# Patient Record
Sex: FEMALE | Race: WHITE | URBAN - METROPOLITAN AREA
[De-identification: names, ages, dates, MRNs, and addresses within clinical notes are randomized per-mention and may not be internally consistent; named-entity substitution may affect disease eponyms.]

---

## 2017-01-25 PROCEDURE — 87491 CHLMYD TRACH DNA AMP PROBE: CPT | Performed by: OBSTETRICS & GYNECOLOGY

## 2017-01-25 PROCEDURE — 87591 N.GONORRHOEAE DNA AMP PROB: CPT | Performed by: OBSTETRICS & GYNECOLOGY

## 2017-12-21 ENCOUNTER — OFFICE VISIT (OUTPATIENT)
Dept: FAMILY MEDICINE CLINIC | Facility: CLINIC | Age: 22
End: 2017-12-21

## 2017-12-21 VITALS
HEIGHT: 65 IN | TEMPERATURE: 103 F | OXYGEN SATURATION: 98 % | RESPIRATION RATE: 16 BRPM | WEIGHT: 149 LBS | DIASTOLIC BLOOD PRESSURE: 70 MMHG | HEART RATE: 121 BPM | SYSTOLIC BLOOD PRESSURE: 108 MMHG | BODY MASS INDEX: 24.83 KG/M2

## 2017-12-21 DIAGNOSIS — R68.89 FLU-LIKE SYMPTOMS: Primary | ICD-10-CM

## 2017-12-21 PROCEDURE — 99203 OFFICE O/P NEW LOW 30 MIN: CPT | Performed by: NURSE PRACTITIONER

## 2017-12-22 NOTE — PROGRESS NOTES
Patient presents with:  Cough: nasal congestion,bodyache,headache,chills sx x 2-3 days.   :    HPI:   Angela Sainz is a 20year old female who presents for upper respiratory symptoms for  3  days. Started suddenly. Getting worse.  Feeling feverish,chills exertion  GI: no nausea or abdominal pain, diarrhea. URO: no decreased urination.       EXAM:   /70 (BP Location: Right arm, Patient Position: Sitting, Cuff Size: adult)   Pulse 121   Temp (!) 102.8 °F (39.3 °C)   Resp 16   Ht 65\"   Wt 149 lb   LMP

## 2018-01-26 PROCEDURE — 87591 N.GONORRHOEAE DNA AMP PROB: CPT | Performed by: OBSTETRICS & GYNECOLOGY

## 2018-01-26 PROCEDURE — 88175 CYTOPATH C/V AUTO FLUID REDO: CPT | Performed by: OBSTETRICS & GYNECOLOGY

## 2018-01-26 PROCEDURE — 87491 CHLMYD TRACH DNA AMP PROBE: CPT | Performed by: OBSTETRICS & GYNECOLOGY

## 2019-07-11 PROCEDURE — 86480 TB TEST CELL IMMUN MEASURE: CPT | Performed by: FAMILY MEDICINE

## 2019-07-11 PROCEDURE — 86706 HEP B SURFACE ANTIBODY: CPT | Performed by: FAMILY MEDICINE

## 2019-10-29 PROBLEM — S62.355D CLOSED NONDISPLACED FRACTURE OF SHAFT OF FOURTH METACARPAL BONE OF LEFT HAND WITH ROUTINE HEALING, SUBSEQUENT ENCOUNTER: Status: ACTIVE | Noted: 2019-10-29

## 2021-02-20 ENCOUNTER — WALK IN (OUTPATIENT)
Dept: URGENT CARE | Age: 26
End: 2021-02-20

## 2021-02-20 DIAGNOSIS — N30.00 ACUTE CYSTITIS WITHOUT HEMATURIA: Primary | ICD-10-CM

## 2021-02-20 LAB
APPEARANCE, POC: ABNORMAL
BILIRUBIN, POC: NEGATIVE
COLOR, POC: YELLOW
GLUCOSE UR-MCNC: NEGATIVE MG/DL
KETONES, POC: NEGATIVE
NITRITE, POC: NEGATIVE
OCCULT BLOOD, POC: ABNORMAL
PH UR: 8 [PH] (ref 5–9)
PROT UR-MCNC: NEGATIVE G/DL
SP GR UR: 1 (ref 1–1.03)
UROBILINOGEN UR-MCNC: 0.2 MG/DL (ref 0–1)
WBC (LEUKOCYTE) ESTERASE, POC: ABNORMAL

## 2021-02-20 PROCEDURE — 81002 URINALYSIS NONAUTO W/O SCOPE: CPT | Performed by: NURSE PRACTITIONER

## 2021-02-20 PROCEDURE — 99203 OFFICE O/P NEW LOW 30 MIN: CPT | Performed by: NURSE PRACTITIONER

## 2021-02-20 RX ORDER — NITROFURANTOIN 25; 75 MG/1; MG/1
100 CAPSULE ORAL 2 TIMES DAILY
Qty: 10 CAPSULE | Refills: 0 | Status: SHIPPED | OUTPATIENT
Start: 2021-02-20 | End: 2021-02-25

## 2021-02-20 ASSESSMENT — ENCOUNTER SYMPTOMS
FEVER: 0
ABDOMINAL DISTENTION: 0
CHILLS: 0
FATIGUE: 0
PSYCHIATRIC NEGATIVE: 1
RESPIRATORY NEGATIVE: 1
DIARRHEA: 0
NAUSEA: 0
DIAPHORESIS: 0
ABDOMINAL PAIN: 0
ACTIVITY CHANGE: 0
UNEXPECTED WEIGHT CHANGE: 0
APPETITE CHANGE: 0
CONSTITUTIONAL NEGATIVE: 1
NEUROLOGICAL NEGATIVE: 1
CONSTIPATION: 0

## 2021-02-20 ASSESSMENT — PAIN SCALES - GENERAL: PAINLEVEL: 0

## 2021-05-26 VITALS
WEIGHT: 145 LBS | DIASTOLIC BLOOD PRESSURE: 70 MMHG | RESPIRATION RATE: 20 BRPM | OXYGEN SATURATION: 99 % | SYSTOLIC BLOOD PRESSURE: 122 MMHG | HEART RATE: 72 BPM | TEMPERATURE: 98.6 F | BODY MASS INDEX: 24.16 KG/M2 | HEIGHT: 65 IN

## 2021-06-14 ENCOUNTER — WALK IN (OUTPATIENT)
Dept: URGENT CARE | Age: 26
End: 2021-06-14

## 2021-06-14 VITALS
RESPIRATION RATE: 20 BRPM | HEART RATE: 88 BPM | OXYGEN SATURATION: 98 % | BODY MASS INDEX: 24.16 KG/M2 | WEIGHT: 145 LBS | DIASTOLIC BLOOD PRESSURE: 66 MMHG | TEMPERATURE: 99.2 F | SYSTOLIC BLOOD PRESSURE: 100 MMHG | HEIGHT: 65 IN

## 2021-06-14 DIAGNOSIS — N30.01 ACUTE CYSTITIS WITH HEMATURIA: Primary | ICD-10-CM

## 2021-06-14 LAB
APPEARANCE, POC: CLEAR
BILIRUBIN, POC: NEGATIVE
COLOR, POC: ABNORMAL
GLUCOSE UR-MCNC: NEGATIVE MG/DL
KETONES, POC: NEGATIVE MG/DL
NITRITE, POC: NEGATIVE
OCCULT BLOOD, POC: ABNORMAL
PH UR: 6.5 [PH] (ref 5–7)
PROT UR-MCNC: NEGATIVE MG/DL
SP GR UR: 1.01 (ref 1–1.03)
UROBILINOGEN UR-MCNC: 0.2 MG/DL (ref 0–1)
WBC (LEUKOCYTE) ESTERASE, POC: ABNORMAL

## 2021-06-14 PROCEDURE — 99212 OFFICE O/P EST SF 10 MIN: CPT | Performed by: NURSE PRACTITIONER

## 2021-06-14 PROCEDURE — 81002 URINALYSIS NONAUTO W/O SCOPE: CPT | Performed by: NURSE PRACTITIONER

## 2021-06-14 RX ORDER — NITROFURANTOIN 25; 75 MG/1; MG/1
100 CAPSULE ORAL 2 TIMES DAILY
Qty: 10 CAPSULE | Refills: 0 | Status: SHIPPED | OUTPATIENT
Start: 2021-06-14

## 2021-06-14 ASSESSMENT — ENCOUNTER SYMPTOMS
ACTIVITY CHANGE: 0
PSYCHIATRIC NEGATIVE: 1
FEVER: 0
CONSTITUTIONAL NEGATIVE: 1
DIAPHORESIS: 0
RESPIRATORY NEGATIVE: 1
APPETITE CHANGE: 0
CHILLS: 0
UNEXPECTED WEIGHT CHANGE: 0
FATIGUE: 0
NEUROLOGICAL NEGATIVE: 1

## 2021-06-14 ASSESSMENT — PAIN SCALES - GENERAL: PAINLEVEL: 2

## 2021-12-21 ENCOUNTER — WALK IN (OUTPATIENT)
Dept: URGENT CARE | Age: 26
End: 2021-12-21

## 2021-12-21 VITALS
OXYGEN SATURATION: 100 % | RESPIRATION RATE: 18 BRPM | TEMPERATURE: 99.1 F | DIASTOLIC BLOOD PRESSURE: 82 MMHG | HEART RATE: 67 BPM | SYSTOLIC BLOOD PRESSURE: 123 MMHG

## 2021-12-21 DIAGNOSIS — R09.81 NASAL CONGESTION WITH RHINORRHEA: Primary | ICD-10-CM

## 2021-12-21 DIAGNOSIS — J34.89 NASAL CONGESTION WITH RHINORRHEA: Primary | ICD-10-CM

## 2021-12-21 DIAGNOSIS — Z20.822 EXPOSURE TO COVID-19 VIRUS: ICD-10-CM

## 2021-12-21 DIAGNOSIS — J30.9 ALLERGIC RHINITIS, UNSPECIFIED SEASONALITY, UNSPECIFIED TRIGGER: ICD-10-CM

## 2021-12-21 LAB
SARS-COV+SARS-COV-2 AG RESP QL IA.RAPID: NOT DETECTED
SERVICE CMNT-IMP: NORMAL

## 2021-12-21 PROCEDURE — 99214 OFFICE O/P EST MOD 30 MIN: CPT | Performed by: FAMILY MEDICINE

## 2021-12-21 PROCEDURE — 87426 SARSCOV CORONAVIRUS AG IA: CPT | Performed by: INTERNAL MEDICINE
